# Patient Record
Sex: FEMALE | Race: WHITE | NOT HISPANIC OR LATINO | Employment: FULL TIME | URBAN - METROPOLITAN AREA
[De-identification: names, ages, dates, MRNs, and addresses within clinical notes are randomized per-mention and may not be internally consistent; named-entity substitution may affect disease eponyms.]

---

## 2023-02-20 PROBLEM — Z80.3 FAMILY HISTORY OF MALIGNANT NEOPLASM OF BREAST: Status: ACTIVE | Noted: 2023-02-20

## 2023-02-20 PROBLEM — C50.212 MALIGNANT NEOPLASM OF UPPER-INNER QUADRANT OF LEFT FEMALE BREAST: Status: ACTIVE | Noted: 2023-02-20

## 2023-02-20 PROBLEM — N64.4 MASTODYNIA: Status: ACTIVE | Noted: 2023-02-20

## 2023-02-20 NOTE — PROGRESS NOTES
[facility]    Chief Complaint:   This patient is returning for her 6-month follow-up concerning her breast cancer issues. She is due for a PE, mammogram and CXR. She has reported no interval changes     History of Present Illness:   Mrs. Triplett was diagnosed with left breast cancer in December 2017 at the age of 32. SLN biopsy prior to chemotherapy showed four negative SLNs. She completed dutch-adjuvant chemotherapy. She elected lumpectomy that showed no residual cancer and therefore, a Complete Pathological Response. She then completed adjuvant Radiation with Dr. Mckeon. ER 20% Positive, NH negative and HER 2 FISH negative. She's been on Tamoxifen since November 2018. [facility] Comprehensive Panel Negative (2018) MD::: Patsy Bautista MD; Brandyn Villagran MD.        Past Medical History:   Diagnosis Date    Family history of malignant neoplasm of breast 2/20/2023    Malignant neoplasm of upper-inner quadrant of left female breast 2/20/2023    Mastodynia 2/20/2023      No past surgical history on file.   left lumpectomy and port removal 7-2018       left SLN biopsy and Port Placement 1-2018       left breast sonocore biopsy        wisdom teeth extraction      Current Outpatient Medications:     tamoxifen (NOLVADEX) 20 MG Tab, Take 20 mg by mouth., Disp: , Rfl:    Review of patient's allergies indicates:  Not on File   Social History     Tobacco Use    Smoking status: Not on file    Smokeless tobacco: Not on file   Substance Use Topics    Alcohol use: Not on file      No family history on file.   Review of Systems   Integumentary:  Negative for color change, pallor, rash, breast mass, breast discharge and breast tenderness. Negative.   Hematological:  Negative for adenopathy.   Breast: Negative for mass and tenderness   Physical Exam  Chest:   Breasts:     Breasts are symmetrical.      Right: Normal. No swelling, bleeding, inverted nipple, mass, skin change or tenderness.       Left: Normal. No swelling, bleeding, inverted nipple, mass, nipple discharge, skin change or tenderness.      Comments: normal findings on exam, no skin changes or worrisome lumps/masses; no signs of malignancy.  Lymphadenopathy:      Upper Body:      Right upper body: No supraclavicular, axillary or pectoral adenopathy.      Left upper body: No supraclavicular, axillary or pectoral adenopathy.      MAMMOGRAM REPORT: She has some diffuse fibronodular tissue bilaterally; there are no spiculated lesions, distortions or suspicious calcifications noted; NEM    CXR REPORT: Her lungs are well aerated and without worrisome masses; no pleural effusion noted; NEM      1. Malignant neoplasm of upper-inner quadrant of left breast in female, estrogen receptor positive  Overview:   Patient is doing well and is being followed according to NCCN Guidelines. She understands that her imaging and exams have remained stable and is comfortable being followed in a conservative fashion. She understands the importance of monthly self-breast examination and knows to report any and all changes as they occur.  She will continue her Tamoxifen as directed.    2. Mastodynia  Overview:  We discussed our fibrocystic mastopathy protocol in detail.        3. Family history of malignant neoplasm of breast     Patient is doing well and is being followed according to NCCN Guidelines. She understands that her imaging and exams have remained stable and is comfortable being followed in a conservative fashion. She understands the importance of monthly self-breast examination and knows to report any and all changes as they occur.         Medical Decision Making:  It is my impression that the patient suffers from all the listed conditions.  Each of these conditions did affect my Plan of Care and all medical decisions that were rendered.  The medical decision making was of high difficulty based on her comorbidities and her previous diagnosis of breast cancer,  which can pose a direct threat to her life.  This patient is currently being treated with ongoing Hormonal Agents that pose inherent risks. Laboratory evaluations are currently pending but will be reviewed in the next 24 hours. Any further recommendations will be communicated to the patient by me.  I have reviewed and verified her allergies, list of medications, medical and surgical histories, social history, and a pertinent review of symptoms.       Follow up:  6 month, PRN (PE, US)      Significant time was spent with the patient of which more than 75% was spent on patient counseling, answering her questions, education of the above treatment regimen and coordination of medical care. Services provided to this patient were performed in the medical office of Dr. Gerald Hampton. These services were furnished under the physician's direct personal supervision.  Her imaging is normal.

## 2023-02-24 DIAGNOSIS — C50.212 MALIGNANT NEOPLASM OF UPPER-INNER QUADRANT OF LEFT FEMALE BREAST: Primary | ICD-10-CM

## 2023-02-27 ENCOUNTER — OFFICE VISIT (OUTPATIENT)
Dept: SURGERY | Facility: CLINIC | Age: 38
End: 2023-02-27
Payer: COMMERCIAL

## 2023-02-27 DIAGNOSIS — C50.212 MALIGNANT NEOPLASM OF UPPER-INNER QUADRANT OF LEFT BREAST IN FEMALE, ESTROGEN RECEPTOR POSITIVE: Primary | ICD-10-CM

## 2023-02-27 DIAGNOSIS — Z17.0 MALIGNANT NEOPLASM OF UPPER-INNER QUADRANT OF LEFT BREAST IN FEMALE, ESTROGEN RECEPTOR POSITIVE: Primary | ICD-10-CM

## 2023-02-27 DIAGNOSIS — N64.4 MASTODYNIA: ICD-10-CM

## 2023-02-27 DIAGNOSIS — Z80.3 FAMILY HISTORY OF MALIGNANT NEOPLASM OF BREAST: ICD-10-CM

## 2023-02-27 PROCEDURE — 99214 OFFICE O/P EST MOD 30 MIN: CPT | Mod: S$GLB,,, | Performed by: NURSE PRACTITIONER

## 2023-02-27 PROCEDURE — 1159F MED LIST DOCD IN RCRD: CPT | Mod: CPTII,S$GLB,, | Performed by: NURSE PRACTITIONER

## 2023-02-27 PROCEDURE — 1160F RVW MEDS BY RX/DR IN RCRD: CPT | Mod: CPTII,S$GLB,, | Performed by: NURSE PRACTITIONER

## 2023-02-27 PROCEDURE — 1159F PR MEDICATION LIST DOCUMENTED IN MEDICAL RECORD: ICD-10-PCS | Mod: CPTII,S$GLB,, | Performed by: NURSE PRACTITIONER

## 2023-02-27 PROCEDURE — 99214 PR OFFICE/OUTPT VISIT, EST, LEVL IV, 30-39 MIN: ICD-10-PCS | Mod: S$GLB,,, | Performed by: NURSE PRACTITIONER

## 2023-02-27 PROCEDURE — 82378 CARCINOEMBRYONIC ANTIGEN: CPT | Performed by: SPECIALIST

## 2023-02-27 PROCEDURE — 1160F PR REVIEW ALL MEDS BY PRESCRIBER/CLIN PHARMACIST DOCUMENTED: ICD-10-PCS | Mod: CPTII,S$GLB,, | Performed by: NURSE PRACTITIONER

## 2023-02-27 PROCEDURE — 80053 COMPREHEN METABOLIC PANEL: CPT | Performed by: SPECIALIST

## 2023-02-27 PROCEDURE — 86300 IMMUNOASSAY TUMOR CA 15-3: CPT | Performed by: SPECIALIST

## 2023-02-27 PROCEDURE — 36415 COLL VENOUS BLD VENIPUNCTURE: CPT

## 2023-02-27 PROCEDURE — 83615 LACTATE (LD) (LDH) ENZYME: CPT | Performed by: SPECIALIST

## 2023-02-27 PROCEDURE — 85025 COMPLETE CBC W/AUTO DIFF WBC: CPT | Performed by: SPECIALIST

## 2023-02-27 RX ORDER — LORAZEPAM 0.5 MG/1
0.5 TABLET ORAL EVERY 6 HOURS PRN
COMMUNITY
Start: 2022-12-27

## 2023-02-27 RX ORDER — TAMOXIFEN CITRATE 20 MG/1
20 TABLET ORAL
COMMUNITY
Start: 2022-10-25

## 2023-02-28 LAB
ALBUMIN SERPL BCP-MCNC: 4.1 G/DL (ref 3.5–5.2)
ALP SERPL-CCNC: 41 U/L (ref 55–135)
ALT SERPL W/O P-5'-P-CCNC: 10 U/L (ref 10–44)
ANION GAP SERPL CALC-SCNC: 10 MMOL/L (ref 8–16)
AST SERPL-CCNC: 18 U/L (ref 10–40)
BASOPHILS # BLD AUTO: 0.04 K/UL (ref 0–0.2)
BASOPHILS NFR BLD: 0.5 % (ref 0–1.9)
BILIRUB SERPL-MCNC: 0.2 MG/DL (ref 0.1–1)
BUN SERPL-MCNC: 15 MG/DL (ref 6–20)
CALCIUM SERPL-MCNC: 9.4 MG/DL (ref 8.7–10.5)
CEA SERPL-MCNC: <1.7 NG/ML (ref 0–5)
CHLORIDE SERPL-SCNC: 106 MMOL/L (ref 95–110)
CO2 SERPL-SCNC: 23 MMOL/L (ref 23–29)
CREAT SERPL-MCNC: 0.8 MG/DL (ref 0.5–1.4)
DIFFERENTIAL METHOD: ABNORMAL
EOSINOPHIL # BLD AUTO: 0 K/UL (ref 0–0.5)
EOSINOPHIL NFR BLD: 0.5 % (ref 0–8)
ERYTHROCYTE [DISTWIDTH] IN BLOOD BY AUTOMATED COUNT: 12.9 % (ref 11.5–14.5)
EST. GFR  (NO RACE VARIABLE): >60 ML/MIN/1.73 M^2
GLUCOSE SERPL-MCNC: 81 MG/DL (ref 70–110)
HCT VFR BLD AUTO: 38.7 % (ref 37–48.5)
HGB BLD-MCNC: 12.4 G/DL (ref 12–16)
IMM GRANULOCYTES # BLD AUTO: 0.02 K/UL (ref 0–0.04)
IMM GRANULOCYTES NFR BLD AUTO: 0.2 % (ref 0–0.5)
LDH SERPL L TO P-CCNC: 218 U/L (ref 110–260)
LYMPHOCYTES # BLD AUTO: 1.5 K/UL (ref 1–4.8)
LYMPHOCYTES NFR BLD: 18.2 % (ref 18–48)
MCH RBC QN AUTO: 29.8 PG (ref 27–31)
MCHC RBC AUTO-ENTMCNC: 32 G/DL (ref 32–36)
MCV RBC AUTO: 93 FL (ref 82–98)
MONOCYTES # BLD AUTO: 0.5 K/UL (ref 0.3–1)
MONOCYTES NFR BLD: 6.5 % (ref 4–15)
NEUTROPHILS # BLD AUTO: 6.1 K/UL (ref 1.8–7.7)
NEUTROPHILS NFR BLD: 74.1 % (ref 38–73)
NRBC BLD-RTO: 0 /100 WBC
PLATELET # BLD AUTO: 265 K/UL (ref 150–450)
PMV BLD AUTO: 10.1 FL (ref 9.2–12.9)
POTASSIUM SERPL-SCNC: 4.2 MMOL/L (ref 3.5–5.1)
PROT SERPL-MCNC: 7.2 G/DL (ref 6–8.4)
RBC # BLD AUTO: 4.16 M/UL (ref 4–5.4)
SODIUM SERPL-SCNC: 139 MMOL/L (ref 136–145)
WBC # BLD AUTO: 8.17 K/UL (ref 3.9–12.7)

## 2023-03-01 LAB — CANCER AG27-29 SERPL-ACNC: 16 U/ML

## 2023-03-14 ENCOUNTER — TELEPHONE (OUTPATIENT)
Dept: SURGERY | Facility: CLINIC | Age: 38
End: 2023-03-14
Payer: COMMERCIAL

## 2023-03-14 NOTE — TELEPHONE ENCOUNTER
I spoke to Sherri. She's scheduled to have a D&C with Dr. Bautista. She will keep us in the loop. We also reviewed her labs. She will set up her patient portal so she can access her chart.

## 2023-06-12 ENCOUNTER — TELEPHONE (OUTPATIENT)
Dept: SURGERY | Facility: CLINIC | Age: 38
End: 2023-06-12
Payer: COMMERCIAL

## 2023-06-12 NOTE — TELEPHONE ENCOUNTER
----- Message from Otto Conley sent at 6/7/2023 11:39 AM CDT -----  Patient would like to consult with office in regards to her current soreness. Please call to advise at [phone]. Thanks

## 2023-06-15 ENCOUNTER — TELEPHONE (OUTPATIENT)
Dept: SURGERY | Facility: CLINIC | Age: 38
End: 2023-06-15
Payer: COMMERCIAL

## 2023-08-04 ENCOUNTER — TELEPHONE (OUTPATIENT)
Dept: SURGERY | Facility: CLINIC | Age: 38
End: 2023-08-04
Payer: COMMERCIAL

## 2023-08-18 PROBLEM — Z17.0 MALIGNANT NEOPLASM OF UPPER-INNER QUADRANT OF LEFT BREAST IN FEMALE, ESTROGEN RECEPTOR POSITIVE: Status: ACTIVE | Noted: 2023-02-20

## 2023-08-18 NOTE — PROGRESS NOTES
[facility]  Gerald Hampton MD, FACS  Sofia Mesa NP-BELKIS      Date of Service: 08/28/2023    Chief Complaint:   Sherri Triplett is a 38 y.o. female presenting today for her 6-month evaluation given her previous diagnosis of breast cancer. She is due for an ultrasound.  She reports no interval changes.     History of Present Illness:   Mrs. Triplett was diagnosed with left breast cancer in December 2017 at the age of 32. SLN biopsy prior to chemotherapy showed four negative SLNs. She completed dutch-adjuvant chemotherapy. She elected lumpectomy that showed no residual cancer and therefore, a Complete Pathological Response. She then completed adjuvant Radiation with Dr. Mckeon. ER 20% Positive, NE negative and HER 2 FISH negative. She's been on Tamoxifen since November 2018. [facility] Comprehensive Panel Negative (2018) MD::: Patsy Bautista MD; Brandyn Villagran MD.    Past Medical History:   Diagnosis Date    Family history of malignant neoplasm of breast 2/20/2023    Mastodynia 2/20/2023      History reviewed. No pertinent surgical history.     Current Outpatient Medications:     LORazepam (ATIVAN) 0.5 MG tablet, Take 0.5 mg by mouth every 6 (six) hours as needed., Disp: , Rfl:     tamoxifen (NOLVADEX) 20 MG Tab, Take 20 mg by mouth., Disp: , Rfl:    Review of patient's allergies indicates:   Allergen Reactions    Cefprozil      Other reaction(s): Not Indicated  Red, puffy cheeks        Social History     Tobacco Use    Smoking status: Never    Smokeless tobacco: Never   Substance Use Topics    Alcohol use: Yes     Alcohol/week: 1.0 standard drink of alcohol     Types: 1 Glasses of wine per week      History reviewed. No pertinent family history.     Review of Systems   Integumentary:  Negative for color change, rash, mole/lesion, breast mass, breast discharge and breast tenderness.   Breast: Negative for mass and tenderness       Physical Exam   Constitutional: She is  cooperative.   HENT:   Head: Normocephalic.   Pulmonary/Chest: She exhibits no mass. Right breast exhibits no inverted nipple, no mass, no nipple discharge, no skin change and no tenderness. Left breast exhibits no inverted nipple, no mass, no nipple discharge, no skin change and no tenderness.   Abdominal: Normal appearance.   Musculoskeletal: Lymphadenopathy:      Upper Body:      Right upper body: No supraclavicular or axillary adenopathy.      Left upper body: No supraclavicular or axillary adenopathy.     Neurological: She is alert.   Skin: No rash noted.        ULTRASOUND EVALUATION and REPORT    Bilateral real-time Ultrasound was performed by me.  All four quadrants of the breast, the subareolar and axillary basins were scanned.    She has some heterogeneous dense fibroglandular tissue bilaterally.  We discussed  this dense tissue and its potential implications.    Right Breast: She has normal tissues in the right breast; there's no disruption of the tissue planes and no abnormal shadowing; she has normal skin thickness with no subcutaneous nodules of skin thickening; small simple cyst in UIQ; NEM     Left Breast: She has normal tissues in the left breast; there's no disruption of the tissue planes  (except at the site of her scar(s)) and no abnormal shadowing; she has normal skin thickness with no subcutaneous nodules or skin thickening; NEM     Axillae: There are no abnormal lymph nodes seen bilaterally.     Impression: Some dense but normal tissue bilaterally with no solid/suspicious masses noted. No LAD in bilateral axillae.      BIRADS: Category 2 - Benign Finding.    Findings were discussed with patient in real time and a hand written report was given to her at the conclusion of the exam.        ASSESSMENT and PLAN OF CARE     1. Malignant neoplasm of upper-inner quadrant of left breast in female, estrogen receptor positive  Assessment & Plan:  Patient is doing well and is being followed according to  NCCN Guidelines. She understands that her imaging and exams have remained stable and is comfortable being followed in a conservative fashion. She understands the importance of monthly self-breast examination and knows to report any and all changes as they occur.    NOTE:::We viewed her films together at today's visit.  We discussed the multiple views obtained and the important findings.  Even benign changes were mentioned and her questions were answered.  She knows that she may receive a formal letter or report from the Radiologist.  She is to contact us if she has questions.    She should continue her Tamoxifen as directed.  We discussed some of the side effects and methods to combat these.  Should she have any issues, she can contact us as needed.    Labs obtained today: CBC, Chem 12, CEA, LDH, CA27/29 - after resulted, these will be compared to her previous values and all abnormal values will be phoned to her for discussion.      Orders:  -     CEA  -     Lactate Dehydrogenase  -     CBC Auto Differential  -     Comprehensive Metabolic Panel  -     Cancer Antigen 27-29    2. Mastodynia  Assessment & Plan:  We discussed our fibrocystic mastopathy protocol in detail. She knows that if she follows this protocol - that her symptoms should improve.  We discussed how breast pain is usually not associated with breast cancer, however, pain can be the presenting symptom with some cancers (but this could be coincidental). Still, if her pain does not improve in 8-12 weeks she should call us back for additional recommendations.        3. Family history of malignant neoplasm of breast  Assessment & Plan:  We discussed her family history and how it could impact her own future risks.  We discussed family vs. genetic history and the importance and implications of each.  Genetic Counseling/Testing was offered, and all questions answered to her satisfaction.  She knows that as additional family members are diagnosed - she will  need to let us know as this may change follow up and imaging recommendations.    We had a discussion concerning Breast Cancer Risk Reduction and current NCCN Guidelines. She knows that her risk can be lowered slightly with a healthy lifestyle and minimal ETOH use. Being physically active will also help. She should reduce or stay away from OCPs and HRT as possible.         Medical Decision Making: HT - It is my impression that the patient suffers from all the listed conditions.  Each of these conditions did affect my Plan of Care and all medical decisions that were rendered.  The medical decision making was of high difficulty based on her comorbidities and her previous diagnosis of breast cancer, which can pose a direct threat to her life.  This patient is currently being treated with ongoing Hormonal Agents that pose inherent risks. Laboratory evaluations are currently pending but will be reviewed in the next 24 hours. Any further recommendations will be communicated to the patient by me.  I have reviewed and verified her allergies, list of medications, medical and surgical histories, social history, and a pertinent review of symptoms.      Follow up: 6 months and prn     For:  Physical Examination, MGM (D) at , CXR, and LABS

## 2023-08-18 NOTE — ASSESSMENT & PLAN NOTE
Patient is doing well and is being followed according to NCCN Guidelines. She understands that her imaging and exams have remained stable and is comfortable being followed in a conservative fashion. She understands the importance of monthly self-breast examination and knows to report any and all changes as they occur.    NOTE:::We viewed her films together at today's visit.  We discussed the multiple views obtained and the important findings.  Even benign changes were mentioned and her questions were answered.  She knows that she may receive a formal letter or report from the Radiologist.  She is to contact us if she has questions.    She should continue her Tamoxifen as directed.  We discussed some of the side effects and methods to combat these.  Should she have any issues, she can contact us as needed.    Labs obtained today: CBC, Chem 12, CEA, LDH, CA27/29 - after resulted, these will be compared to her previous values and all abnormal values will be phoned to her for discussion.

## 2023-08-28 ENCOUNTER — OFFICE VISIT (OUTPATIENT)
Dept: SURGERY | Facility: CLINIC | Age: 38
End: 2023-08-28
Payer: COMMERCIAL

## 2023-08-28 DIAGNOSIS — N64.4 MASTODYNIA: ICD-10-CM

## 2023-08-28 DIAGNOSIS — C50.212 MALIGNANT NEOPLASM OF UPPER-INNER QUADRANT OF LEFT BREAST IN FEMALE, ESTROGEN RECEPTOR POSITIVE: Primary | ICD-10-CM

## 2023-08-28 DIAGNOSIS — Z17.0 MALIGNANT NEOPLASM OF UPPER-INNER QUADRANT OF LEFT BREAST IN FEMALE, ESTROGEN RECEPTOR POSITIVE: Primary | ICD-10-CM

## 2023-08-28 DIAGNOSIS — Z80.3 FAMILY HISTORY OF MALIGNANT NEOPLASM OF BREAST: ICD-10-CM

## 2023-08-28 PROCEDURE — 1159F MED LIST DOCD IN RCRD: CPT | Mod: CPTII,S$GLB,, | Performed by: SPECIALIST

## 2023-08-28 PROCEDURE — 85025 COMPLETE CBC W/AUTO DIFF WBC: CPT | Performed by: SPECIALIST

## 2023-08-28 PROCEDURE — 86300 IMMUNOASSAY TUMOR CA 15-3: CPT | Performed by: SPECIALIST

## 2023-08-28 PROCEDURE — 1160F PR REVIEW ALL MEDS BY PRESCRIBER/CLIN PHARMACIST DOCUMENTED: ICD-10-PCS | Mod: CPTII,S$GLB,, | Performed by: SPECIALIST

## 2023-08-28 PROCEDURE — 1159F PR MEDICATION LIST DOCUMENTED IN MEDICAL RECORD: ICD-10-PCS | Mod: CPTII,S$GLB,, | Performed by: SPECIALIST

## 2023-08-28 PROCEDURE — 99214 OFFICE O/P EST MOD 30 MIN: CPT | Mod: S$GLB,,, | Performed by: SPECIALIST

## 2023-08-28 PROCEDURE — 80053 COMPREHEN METABOLIC PANEL: CPT | Performed by: SPECIALIST

## 2023-08-28 PROCEDURE — 99214 PR OFFICE/OUTPT VISIT, EST, LEVL IV, 30-39 MIN: ICD-10-PCS | Mod: S$GLB,,, | Performed by: SPECIALIST

## 2023-08-28 PROCEDURE — 82378 CARCINOEMBRYONIC ANTIGEN: CPT | Performed by: SPECIALIST

## 2023-08-28 PROCEDURE — 1160F RVW MEDS BY RX/DR IN RCRD: CPT | Mod: CPTII,S$GLB,, | Performed by: SPECIALIST

## 2023-08-28 PROCEDURE — 83615 LACTATE (LD) (LDH) ENZYME: CPT | Performed by: SPECIALIST

## 2023-08-29 LAB
ALBUMIN SERPL BCP-MCNC: 3.9 G/DL (ref 3.5–5.2)
ALP SERPL-CCNC: 37 U/L (ref 55–135)
ALT SERPL W/O P-5'-P-CCNC: 12 U/L (ref 10–44)
ANION GAP SERPL CALC-SCNC: 9 MMOL/L (ref 8–16)
AST SERPL-CCNC: 21 U/L (ref 10–40)
BASOPHILS # BLD AUTO: 0.04 K/UL (ref 0–0.2)
BASOPHILS NFR BLD: 0.5 % (ref 0–1.9)
BILIRUB SERPL-MCNC: 0.2 MG/DL (ref 0.1–1)
BUN SERPL-MCNC: 13 MG/DL (ref 6–20)
CALCIUM SERPL-MCNC: 9.3 MG/DL (ref 8.7–10.5)
CEA SERPL-MCNC: <1.7 NG/ML (ref 0–5)
CHLORIDE SERPL-SCNC: 104 MMOL/L (ref 95–110)
CO2 SERPL-SCNC: 25 MMOL/L (ref 23–29)
CREAT SERPL-MCNC: 1.1 MG/DL (ref 0.5–1.4)
DIFFERENTIAL METHOD: ABNORMAL
EOSINOPHIL # BLD AUTO: 0.1 K/UL (ref 0–0.5)
EOSINOPHIL NFR BLD: 0.9 % (ref 0–8)
ERYTHROCYTE [DISTWIDTH] IN BLOOD BY AUTOMATED COUNT: 12.7 % (ref 11.5–14.5)
EST. GFR  (NO RACE VARIABLE): >60 ML/MIN/1.73 M^2
GLUCOSE SERPL-MCNC: 83 MG/DL (ref 70–110)
HCT VFR BLD AUTO: 38.9 % (ref 37–48.5)
HGB BLD-MCNC: 12 G/DL (ref 12–16)
IMM GRANULOCYTES # BLD AUTO: 0.02 K/UL (ref 0–0.04)
IMM GRANULOCYTES NFR BLD AUTO: 0.2 % (ref 0–0.5)
LDH SERPL L TO P-CCNC: 188 U/L (ref 110–260)
LYMPHOCYTES # BLD AUTO: 2.2 K/UL (ref 1–4.8)
LYMPHOCYTES NFR BLD: 27.8 % (ref 18–48)
MCH RBC QN AUTO: 28.9 PG (ref 27–31)
MCHC RBC AUTO-ENTMCNC: 30.8 G/DL (ref 32–36)
MCV RBC AUTO: 94 FL (ref 82–98)
MONOCYTES # BLD AUTO: 0.6 K/UL (ref 0.3–1)
MONOCYTES NFR BLD: 7.5 % (ref 4–15)
NEUTROPHILS # BLD AUTO: 5.1 K/UL (ref 1.8–7.7)
NEUTROPHILS NFR BLD: 63.1 % (ref 38–73)
NRBC BLD-RTO: 0 /100 WBC
PLATELET # BLD AUTO: 261 K/UL (ref 150–450)
PMV BLD AUTO: 10 FL (ref 9.2–12.9)
POTASSIUM SERPL-SCNC: 3.7 MMOL/L (ref 3.5–5.1)
PROT SERPL-MCNC: 6.8 G/DL (ref 6–8.4)
RBC # BLD AUTO: 4.15 M/UL (ref 4–5.4)
SODIUM SERPL-SCNC: 138 MMOL/L (ref 136–145)
WBC # BLD AUTO: 8.03 K/UL (ref 3.9–12.7)

## 2023-08-30 LAB — CANCER AG27-29 SERPL-ACNC: 14.2 U/ML

## 2024-02-25 DIAGNOSIS — C50.212 MALIGNANT NEOPLASM OF UPPER-INNER QUADRANT OF LEFT BREAST IN FEMALE, ESTROGEN RECEPTOR POSITIVE: Primary | ICD-10-CM

## 2024-02-25 DIAGNOSIS — Z17.0 MALIGNANT NEOPLASM OF UPPER-INNER QUADRANT OF LEFT BREAST IN FEMALE, ESTROGEN RECEPTOR POSITIVE: Primary | ICD-10-CM

## 2024-02-25 NOTE — PROGRESS NOTES
Date of Service: 3/13/2024    Chief Complaint:   Sherri Triplett is a 38 y.o. female presenting today for her 6 month follow up due to her breast cancer diagnosis.  She is due for her Mammogram and CXR.  She reports no interval changes on her self-breast examination.     History of Present Illness:   Mrs. Triplett was diagnosed with left breast cancer in December 2017 at the age of 32. SLN biopsy prior to chemotherapy showed four negative SLNs. She completed dutch-adjuvant chemotherapy. She elected lumpectomy that showed no residual cancer and therefore, a Complete Pathological Response. She then completed adjuvant Radiation with Dr. Mckeon. ER 20% Positive, GA negative and HER 2 FISH negative. She's been on Tamoxifen since November 2018. [facility] Comprehensive Panel Negative (2018) MD::: Patsy Bautista MD; Brandyn Villagran MD.    Past Medical History:   Diagnosis Date    Family history of malignant neoplasm of breast 2/20/2023    Mastodynia 2/20/2023      History reviewed. No pertinent surgical history.     Current Outpatient Medications:     desvenlafaxine succinate (PRISTIQ) 50 MG Tb24, Take 50 mg by mouth., Disp: , Rfl:     nitrofurantoin, macrocrystal-monohydrate, (MACROBID) 100 MG capsule, Take 100 mg by mouth 2 (two) times daily., Disp: , Rfl:     LORazepam (ATIVAN) 0.5 MG tablet, Take 0.5 mg by mouth every 6 (six) hours as needed., Disp: , Rfl:     tamoxifen (NOLVADEX) 20 MG Tab, Take 20 mg by mouth., Disp: , Rfl:    Review of patient's allergies indicates:   Allergen Reactions    Cefprozil      Other reaction(s): Not Indicated  Red, puffy cheeks        Social History     Tobacco Use    Smoking status: Never    Smokeless tobacco: Never   Substance Use Topics    Alcohol use: Yes     Alcohol/week: 1.0 standard drink of alcohol     Types: 1 Glasses of wine per week      History reviewed. No pertinent family history.     Review of Systems   Integumentary:  Negative for color change, rash, mole/lesion,  breast mass, breast discharge and breast tenderness.   Breast: Negative for mass and tenderness       Physical Exam   Constitutional: She is cooperative.   HENT:   Head: Normocephalic.   Pulmonary/Chest: She exhibits no mass. Right breast exhibits no inverted nipple, no mass, no nipple discharge, no skin change and no tenderness. Left breast exhibits no inverted nipple, no mass, no nipple discharge, no skin change and no tenderness.   Abdominal: Normal appearance.   Musculoskeletal: Lymphadenopathy:      Upper Body:      Right upper body: No supraclavicular or axillary adenopathy.      Left upper body: No supraclavicular or axillary adenopathy.     Neurological: She is alert.   Skin: No rash noted.     MAMMOGRAM REPORT: She has some diffuse fibronodular tissue; there are no spiculated lesions, distortions or suspicious calcifications noted; NEM    CXR REPORT: Her lungs are well aerated and without worrisome masses; no pleural effusion noted; NEM       ASSESSMENT and PLAN OF CARE     1. Malignant neoplasm of upper-inner quadrant of left breast in female, estrogen receptor positive  Assessment & Plan:  Patient is doing well and is being followed according to NCCN Guidelines. She understands that her imaging and exams have remained stable and is comfortable being followed in a conservative fashion. She understands the importance of monthly self-breast examination and knows to report any and all changes as they occur.    NOTE:::We viewed her films together at today's visit.  We discussed the multiple views obtained and the important findings.  Even benign changes were mentioned and her questions were answered.  She knows that she may receive a formal letter or report from the Radiologist.  She is to contact us if she has questions.    She should continue her Tamoxifen as directed.  We discussed some of the side effects and methods to combat these.  Should she have any issues, she can contact us as needed.    Labs  obtained today: CBC, Chem 12, CEA, LDH, CA27/29 - after resulted, these will be compared to her previous values and all abnormal values will be phoned to her for discussion.     Orders:  -     Cancer Antigen 27-29  -     Comprehensive Metabolic Panel  -     Lactate Dehydrogenase  -     CEA  -     CBC Auto Differential    2. Family history of malignant neoplasm of breast  Assessment & Plan:  We discussed her family history and how it could impact her own future risks.  We discussed family vs. genetic history and the importance and implications of each.  Genetic Counseling/Testing was offered, and all questions answered to her satisfaction.  She knows that as additional family members are diagnosed - she will need to let us know as this may change follow up and imaging recommendations.    We had a discussion concerning Breast Cancer Risk Reduction and current NCCN Guidelines. She knows that her risk can be lowered slightly with a healthy lifestyle and minimal ETOH use. Being physically active will also help. She should reduce or stay away from OCPs and HRT as possible.         Medical Decision Making: HT - It is my impression that the patient suffers from all the listed conditions.  Each of these conditions did affect my Plan of Care and all medical decisions that were rendered.  The medical decision making was of high difficulty based on her comorbidities and her previous diagnosis of breast cancer, which can pose a direct threat to her life.  This patient is currently being treated with ongoing Hormonal Agents that pose inherent risks. Laboratory evaluations are currently pending but will be reviewed in the next 24 hours. Any further recommendations will be communicated to the patient by me.  I have reviewed and verified her allergies, list of medications, medical and surgical histories, social history, and a pertinent review of symptoms.     Follow up:  6 months and prn    For:  Physical Examination, LABS, and US  with me

## 2024-03-13 ENCOUNTER — OFFICE VISIT (OUTPATIENT)
Dept: SURGERY | Facility: CLINIC | Age: 39
End: 2024-03-13
Payer: COMMERCIAL

## 2024-03-13 DIAGNOSIS — Z80.3 FAMILY HISTORY OF MALIGNANT NEOPLASM OF BREAST: ICD-10-CM

## 2024-03-13 DIAGNOSIS — Z17.0 MALIGNANT NEOPLASM OF UPPER-INNER QUADRANT OF LEFT BREAST IN FEMALE, ESTROGEN RECEPTOR POSITIVE: Primary | ICD-10-CM

## 2024-03-13 DIAGNOSIS — C50.212 MALIGNANT NEOPLASM OF UPPER-INNER QUADRANT OF LEFT BREAST IN FEMALE, ESTROGEN RECEPTOR POSITIVE: Primary | ICD-10-CM

## 2024-03-13 PROCEDURE — 1160F RVW MEDS BY RX/DR IN RCRD: CPT | Mod: CPTII,S$GLB,, | Performed by: SPECIALIST

## 2024-03-13 PROCEDURE — 86300 IMMUNOASSAY TUMOR CA 15-3: CPT | Performed by: SPECIALIST

## 2024-03-13 PROCEDURE — 83615 LACTATE (LD) (LDH) ENZYME: CPT | Performed by: SPECIALIST

## 2024-03-13 PROCEDURE — 99999 PR PBB SHADOW E&M-EST. PATIENT-LVL II: CPT | Mod: PBBFAC,,, | Performed by: SPECIALIST

## 2024-03-13 PROCEDURE — 82378 CARCINOEMBRYONIC ANTIGEN: CPT | Performed by: SPECIALIST

## 2024-03-13 PROCEDURE — 85025 COMPLETE CBC W/AUTO DIFF WBC: CPT | Performed by: SPECIALIST

## 2024-03-13 PROCEDURE — 80053 COMPREHEN METABOLIC PANEL: CPT | Performed by: SPECIALIST

## 2024-03-13 PROCEDURE — 1159F MED LIST DOCD IN RCRD: CPT | Mod: CPTII,S$GLB,, | Performed by: SPECIALIST

## 2024-03-13 PROCEDURE — 99214 OFFICE O/P EST MOD 30 MIN: CPT | Mod: S$GLB,,, | Performed by: SPECIALIST

## 2024-03-13 RX ORDER — NITROFURANTOIN 25; 75 MG/1; MG/1
100 CAPSULE ORAL 2 TIMES DAILY
COMMUNITY
Start: 2023-10-18

## 2024-03-13 RX ORDER — DESVENLAFAXINE SUCCINATE 50 MG/1
50 TABLET, EXTENDED RELEASE ORAL
COMMUNITY
Start: 2023-12-27

## 2024-03-14 LAB
ALBUMIN SERPL BCP-MCNC: 4 G/DL (ref 3.5–5.2)
ALP SERPL-CCNC: 37 U/L (ref 55–135)
ALT SERPL W/O P-5'-P-CCNC: 13 U/L (ref 10–44)
ANION GAP SERPL CALC-SCNC: 9 MMOL/L (ref 8–16)
AST SERPL-CCNC: 18 U/L (ref 10–40)
BASOPHILS # BLD AUTO: 0.06 K/UL (ref 0–0.2)
BASOPHILS NFR BLD: 0.9 % (ref 0–1.9)
BILIRUB SERPL-MCNC: 0.3 MG/DL (ref 0.1–1)
BUN SERPL-MCNC: 11 MG/DL (ref 6–20)
CALCIUM SERPL-MCNC: 9.3 MG/DL (ref 8.7–10.5)
CEA SERPL-MCNC: <1.7 NG/ML (ref 0–5)
CHLORIDE SERPL-SCNC: 108 MMOL/L (ref 95–110)
CO2 SERPL-SCNC: 22 MMOL/L (ref 23–29)
CREAT SERPL-MCNC: 0.8 MG/DL (ref 0.5–1.4)
DIFFERENTIAL METHOD BLD: ABNORMAL
EOSINOPHIL # BLD AUTO: 0.1 K/UL (ref 0–0.5)
EOSINOPHIL NFR BLD: 1 % (ref 0–8)
ERYTHROCYTE [DISTWIDTH] IN BLOOD BY AUTOMATED COUNT: 12.8 % (ref 11.5–14.5)
EST. GFR  (NO RACE VARIABLE): >60 ML/MIN/1.73 M^2
GLUCOSE SERPL-MCNC: 85 MG/DL (ref 70–110)
HCT VFR BLD AUTO: 40.8 % (ref 37–48.5)
HGB BLD-MCNC: 12.9 G/DL (ref 12–16)
IMM GRANULOCYTES # BLD AUTO: 0.02 K/UL (ref 0–0.04)
IMM GRANULOCYTES NFR BLD AUTO: 0.3 % (ref 0–0.5)
LDH SERPL L TO P-CCNC: 174 U/L (ref 110–260)
LYMPHOCYTES # BLD AUTO: 2 K/UL (ref 1–4.8)
LYMPHOCYTES NFR BLD: 29.5 % (ref 18–48)
MCH RBC QN AUTO: 29.3 PG (ref 27–31)
MCHC RBC AUTO-ENTMCNC: 31.6 G/DL (ref 32–36)
MCV RBC AUTO: 93 FL (ref 82–98)
MONOCYTES # BLD AUTO: 0.7 K/UL (ref 0.3–1)
MONOCYTES NFR BLD: 9.7 % (ref 4–15)
NEUTROPHILS # BLD AUTO: 4 K/UL (ref 1.8–7.7)
NEUTROPHILS NFR BLD: 58.6 % (ref 38–73)
NRBC BLD-RTO: 0 /100 WBC
PLATELET # BLD AUTO: 292 K/UL (ref 150–450)
PMV BLD AUTO: 10.3 FL (ref 9.2–12.9)
POTASSIUM SERPL-SCNC: 4.1 MMOL/L (ref 3.5–5.1)
PROT SERPL-MCNC: 7.3 G/DL (ref 6–8.4)
RBC # BLD AUTO: 4.41 M/UL (ref 4–5.4)
SODIUM SERPL-SCNC: 139 MMOL/L (ref 136–145)
WBC # BLD AUTO: 6.88 K/UL (ref 3.9–12.7)

## 2024-03-15 LAB — CANCER AG27-29 SERPL-ACNC: 17.1 U/ML

## 2024-08-27 NOTE — ASSESSMENT & PLAN NOTE
She is doing well and will continue to be followed according to NCCN Guidelines. She understands that her exams have remained stable and is comfortable being followed in a conservative fashion. She understands the importance of monthly self-breast examination and knows to report any and all changes as they occur.    She should continue her Tamoxifen as directed.  We discussed some of the side effects and methods to combat these.  Should she have any issues, she can contact us as needed.    Labs obtained today: CBC, Chem 12, CEA, LDH, CA 27.29 - after resulted, these will be compared to her previous values and all abnormal values will be phoned to her for discussion.

## 2024-08-27 NOTE — PROGRESS NOTES
Date of Service: 9/16/2024    Chief Complaint:   Sherri Triplett is a 39 y.o. female presenting today for her 6-month evaluation given her previous diagnosis of breast cancer. She is due for an ultrasound.  She reports no interval changes.     History of Present Illness:   Mrs. Triplett was diagnosed with left breast cancer in December 2017 at the age of 32. SLN biopsy prior to chemotherapy showed four negative SLNs. She completed dutch-adjuvant chemotherapy. She elected lumpectomy that showed no residual cancer and therefore, a Complete Pathological Response. She then completed adjuvant Radiation with Dr. Mckeon. ER 20% Positive, MA negative and HER 2 FISH negative. She's been on Tamoxifen since November 2018. [facility] Comprehensive Panel Negative (2018) MD::: Patsy Bautista MD; Brandyn Villagran MD.    Past Medical History:   Diagnosis Date    Family history of malignant neoplasm of breast 2/20/2023    Mastodynia 2/20/2023      History reviewed. No pertinent surgical history.     Current Outpatient Medications:     desvenlafaxine succinate (PRISTIQ) 50 MG Tb24, Take 50 mg by mouth., Disp: , Rfl:     LORazepam (ATIVAN) 0.5 MG tablet, Take 0.5 mg by mouth every 6 (six) hours as needed., Disp: , Rfl:     nitrofurantoin, macrocrystal-monohydrate, (MACROBID) 100 MG capsule, Take 100 mg by mouth 2 (two) times daily., Disp: , Rfl:     tamoxifen (NOLVADEX) 20 MG Tab, Take 20 mg by mouth., Disp: , Rfl:    Review of patient's allergies indicates:   Allergen Reactions    Cefprozil      Other reaction(s): Not Indicated  Red, puffy cheeks        Social History     Tobacco Use    Smoking status: Never    Smokeless tobacco: Never   Substance Use Topics    Alcohol use: Yes     Alcohol/week: 1.0 standard drink of alcohol     Types: 1 Glasses of wine per week      No family history on file.     Review of Systems   Integumentary:  Negative for color change, rash, mole/lesion, breast mass, breast discharge and breast tenderness.    Breast: Negative for mass and tenderness       Physical Exam   Constitutional: She is cooperative.   HENT:   Head: Normocephalic.   Pulmonary/Chest: She exhibits no mass. Right breast exhibits no inverted nipple, no mass, no nipple discharge, no skin change and no tenderness. Left breast exhibits no inverted nipple, no mass, no nipple discharge, no skin change and no tenderness.   Abdominal: Normal appearance.   Musculoskeletal: Lymphadenopathy:      Upper Body:      Right upper body: No supraclavicular or axillary adenopathy.      Left upper body: No supraclavicular or axillary adenopathy.     Neurological: She is alert.   Skin: No rash noted.        ULTRASOUND EVALUATION and REPORT    Bilateral real-time Ultrasound was performed by me.  All four quadrants of the breast, the subareolar and axillary basins were scanned.    She has some heterogeneous dense fibroglandular tissue bilaterally.  We discussed  this dense tissue and its potential implications.    Right Breast: She has normal tissues in the right breast; there's no disruption of the tissue planes and no abnormal shadowing; she has normal skin thickness with no subcutaneous nodules of skin thickening; NEM     Left Breast: She has normal tissues in the left breast; there's no disruption of the tissue planes  (except at the site of her scar(s)) and no abnormal shadowing; she has normal skin thickness with no subcutaneous nodules or skin thickening; NEM     Axillae: There are no abnormal lymph nodes seen bilaterally.     Impression: Some dense but normal tissue bilaterally with no solid/suspicious masses noted. No LAD in bilateral axillae.      BIRADS: Category 2 - Benign Finding.    Findings were discussed with patient in real time and a hand written report was given to her at the conclusion of the exam.       ASSESSMENT and PLAN OF CARE     1. Malignant neoplasm of upper-inner quadrant of left breast in female, estrogen receptor positive  Assessment &  Plan:  She is doing well and will continue to be followed according to NCCN Guidelines. She understands that her exams have remained stable and is comfortable being followed in a conservative fashion. She understands the importance of monthly self-breast examination and knows to report any and all changes as they occur.    She should continue her Tamoxifen as directed.  We discussed some of the side effects and methods to combat these.  Should she have any issues, she can contact us as needed.    Labs obtained today: CBC, Chem 12, CEA, LDH, CA 27.29 - after resulted, these will be compared to her previous values and all abnormal values will be phoned to her for discussion.      Orders:  -     Lactate Dehydrogenase; Future; Expected date: 09/16/2024  -     Comprehensive Metabolic Panel; Future; Expected date: 09/16/2024  -     CEA; Future; Expected date: 09/16/2024  -     CBC Auto Differential; Future; Expected date: 09/16/2024  -     Cancer Antigen 27-29; Future; Expected date: 09/16/2024    2. Family history of malignant neoplasm of breast  Assessment & Plan:  We discussed her family history and how it could impact her own future risks.  We discussed family vs. genetic history and the importance and implications of each.  Genetic Counseling/Testing was offered, and all questions answered to her satisfaction.  She knows that as additional family members are diagnosed - she will need to let us know as this may change follow up and imaging recommendations.    We had a discussion concerning Breast Cancer Risk Reduction and current NCCN Guidelines. She knows that her risk can be lowered slightly with a healthy lifestyle and minimal ETOH use. Being physically active will also help. She should reduce or stay away from OCPs and HRT as possible.         3. Mastodynia  Assessment & Plan:  We discussed our fibrocystic mastopathy protocol in detail. She knows that if she follows this protocol - that her symptoms should  improve.  We discussed how breast pain is usually not associated with breast cancer, however, pain can be the presenting symptom with some cancers (but this could be coincidental). Still, if her pain does not improve in 8-12 weeks she should call us back for additional recommendations.        Medical Decision Making: HT - It is my impression that the patient suffers from all the listed conditions.  Each of these conditions did affect my Plan of Care and all medical decisions that were rendered.  The medical decision making was of high difficulty based on her comorbidities and her previous diagnosis of breast cancer, which can pose a direct threat to her life.  This patient is currently being treated with ongoing Hormonal Agents that pose inherent risks. Laboratory evaluations are currently pending but will be reviewed in the next 24 hours. Any further recommendations will be communicated to the patient by me.  I have reviewed and verified her allergies, list of medications, medical and surgical histories, social history, and a pertinent review of symptoms.      Follow up: 6 months and prn     For:  Physical Examination, MGM (D) at , CXR, and LABS

## 2024-09-16 ENCOUNTER — LAB VISIT (OUTPATIENT)
Dept: LAB | Facility: HOSPITAL | Age: 39
End: 2024-09-16
Attending: SPECIALIST
Payer: COMMERCIAL

## 2024-09-16 ENCOUNTER — OFFICE VISIT (OUTPATIENT)
Dept: SURGERY | Facility: CLINIC | Age: 39
End: 2024-09-16
Payer: COMMERCIAL

## 2024-09-16 DIAGNOSIS — C50.212 MALIGNANT NEOPLASM OF UPPER-INNER QUADRANT OF LEFT BREAST IN FEMALE, ESTROGEN RECEPTOR POSITIVE: Primary | ICD-10-CM

## 2024-09-16 DIAGNOSIS — Z17.0 MALIGNANT NEOPLASM OF UPPER-INNER QUADRANT OF LEFT BREAST IN FEMALE, ESTROGEN RECEPTOR POSITIVE: ICD-10-CM

## 2024-09-16 DIAGNOSIS — Z17.0 MALIGNANT NEOPLASM OF UPPER-INNER QUADRANT OF LEFT BREAST IN FEMALE, ESTROGEN RECEPTOR POSITIVE: Primary | ICD-10-CM

## 2024-09-16 DIAGNOSIS — C50.212 MALIGNANT NEOPLASM OF UPPER-INNER QUADRANT OF LEFT BREAST IN FEMALE, ESTROGEN RECEPTOR POSITIVE: ICD-10-CM

## 2024-09-16 DIAGNOSIS — N64.4 MASTODYNIA: ICD-10-CM

## 2024-09-16 DIAGNOSIS — Z80.3 FAMILY HISTORY OF MALIGNANT NEOPLASM OF BREAST: ICD-10-CM

## 2024-09-16 LAB
ALBUMIN SERPL BCP-MCNC: 3.4 G/DL (ref 3.5–5.2)
ALP SERPL-CCNC: 42 U/L (ref 55–135)
ALT SERPL W/O P-5'-P-CCNC: 13 U/L (ref 10–44)
ANION GAP SERPL CALC-SCNC: 10 MMOL/L (ref 8–16)
AST SERPL-CCNC: 17 U/L (ref 10–40)
BASOPHILS # BLD AUTO: 0.05 K/UL (ref 0–0.2)
BASOPHILS NFR BLD: 0.8 % (ref 0–1.9)
BILIRUB SERPL-MCNC: 0.2 MG/DL (ref 0.1–1)
BUN SERPL-MCNC: 12 MG/DL (ref 6–20)
CALCIUM SERPL-MCNC: 9.1 MG/DL (ref 8.7–10.5)
CEA SERPL-MCNC: 1.7 NG/ML (ref 0–5)
CHLORIDE SERPL-SCNC: 105 MMOL/L (ref 95–110)
CO2 SERPL-SCNC: 24 MMOL/L (ref 23–29)
CREAT SERPL-MCNC: 0.7 MG/DL (ref 0.5–1.4)
DIFFERENTIAL METHOD BLD: ABNORMAL
EOSINOPHIL # BLD AUTO: 0.1 K/UL (ref 0–0.5)
EOSINOPHIL NFR BLD: 1.7 % (ref 0–8)
ERYTHROCYTE [DISTWIDTH] IN BLOOD BY AUTOMATED COUNT: 12.5 % (ref 11.5–14.5)
EST. GFR  (NO RACE VARIABLE): >60 ML/MIN/1.73 M^2
GLUCOSE SERPL-MCNC: 88 MG/DL (ref 70–110)
HCT VFR BLD AUTO: 37.1 % (ref 37–48.5)
HGB BLD-MCNC: 11.8 G/DL (ref 12–16)
IMM GRANULOCYTES # BLD AUTO: 0.04 K/UL (ref 0–0.04)
IMM GRANULOCYTES NFR BLD AUTO: 0.6 % (ref 0–0.5)
LDH SERPL L TO P-CCNC: 175 U/L (ref 110–260)
LYMPHOCYTES # BLD AUTO: 1.9 K/UL (ref 1–4.8)
LYMPHOCYTES NFR BLD: 28.3 % (ref 18–48)
MCH RBC QN AUTO: 29.2 PG (ref 27–31)
MCHC RBC AUTO-ENTMCNC: 31.8 G/DL (ref 32–36)
MCV RBC AUTO: 92 FL (ref 82–98)
MONOCYTES # BLD AUTO: 0.5 K/UL (ref 0.3–1)
MONOCYTES NFR BLD: 7.5 % (ref 4–15)
NEUTROPHILS # BLD AUTO: 4.1 K/UL (ref 1.8–7.7)
NEUTROPHILS NFR BLD: 61.1 % (ref 38–73)
NRBC BLD-RTO: 0 /100 WBC
PLATELET # BLD AUTO: 323 K/UL (ref 150–450)
PMV BLD AUTO: 9.3 FL (ref 9.2–12.9)
POTASSIUM SERPL-SCNC: 4.2 MMOL/L (ref 3.5–5.1)
PROT SERPL-MCNC: 6.7 G/DL (ref 6–8.4)
RBC # BLD AUTO: 4.04 M/UL (ref 4–5.4)
SODIUM SERPL-SCNC: 139 MMOL/L (ref 136–145)
WBC # BLD AUTO: 6.64 K/UL (ref 3.9–12.7)

## 2024-09-16 PROCEDURE — 82378 CARCINOEMBRYONIC ANTIGEN: CPT | Performed by: SPECIALIST

## 2024-09-16 PROCEDURE — 99999 PR PBB SHADOW E&M-EST. PATIENT-LVL II: CPT | Mod: PBBFAC,,, | Performed by: SPECIALIST

## 2024-09-16 PROCEDURE — 80053 COMPREHEN METABOLIC PANEL: CPT | Performed by: SPECIALIST

## 2024-09-16 PROCEDURE — 36415 COLL VENOUS BLD VENIPUNCTURE: CPT | Mod: PN | Performed by: SPECIALIST

## 2024-09-16 PROCEDURE — 99214 OFFICE O/P EST MOD 30 MIN: CPT | Mod: S$GLB,,, | Performed by: SPECIALIST

## 2024-09-16 PROCEDURE — 86300 IMMUNOASSAY TUMOR CA 15-3: CPT | Performed by: SPECIALIST

## 2024-09-16 PROCEDURE — 1160F RVW MEDS BY RX/DR IN RCRD: CPT | Mod: CPTII,S$GLB,, | Performed by: SPECIALIST

## 2024-09-16 PROCEDURE — 1159F MED LIST DOCD IN RCRD: CPT | Mod: CPTII,S$GLB,, | Performed by: SPECIALIST

## 2024-09-16 PROCEDURE — 83615 LACTATE (LD) (LDH) ENZYME: CPT | Performed by: SPECIALIST

## 2024-09-16 PROCEDURE — 85025 COMPLETE CBC W/AUTO DIFF WBC: CPT | Performed by: SPECIALIST

## 2024-09-18 LAB — CANCER AG27-29 SERPL-ACNC: 16.7 U/ML

## 2025-02-25 ENCOUNTER — PATIENT MESSAGE (OUTPATIENT)
Dept: SURGERY | Facility: CLINIC | Age: 40
End: 2025-02-25
Payer: COMMERCIAL